# Patient Record
Sex: FEMALE | Race: WHITE | NOT HISPANIC OR LATINO
[De-identification: names, ages, dates, MRNs, and addresses within clinical notes are randomized per-mention and may not be internally consistent; named-entity substitution may affect disease eponyms.]

---

## 2019-08-06 ENCOUNTER — RESULT REVIEW (OUTPATIENT)
Age: 29
End: 2019-08-06

## 2021-01-13 ENCOUNTER — RESULT REVIEW (OUTPATIENT)
Age: 31
End: 2021-01-13

## 2022-03-24 PROBLEM — Z00.00 ENCOUNTER FOR PREVENTIVE HEALTH EXAMINATION: Status: ACTIVE | Noted: 2022-03-24

## 2022-04-09 ENCOUNTER — TRANSCRIPTION ENCOUNTER (OUTPATIENT)
Age: 32
End: 2022-04-09

## 2022-04-11 ENCOUNTER — APPOINTMENT (OUTPATIENT)
Dept: FAMILY MEDICINE | Facility: CLINIC | Age: 32
End: 2022-04-11

## 2022-04-12 ENCOUNTER — APPOINTMENT (OUTPATIENT)
Dept: FAMILY MEDICINE | Facility: CLINIC | Age: 32
End: 2022-04-12
Payer: COMMERCIAL

## 2022-04-12 ENCOUNTER — NON-APPOINTMENT (OUTPATIENT)
Age: 32
End: 2022-04-12

## 2022-04-12 VITALS
RESPIRATION RATE: 14 BRPM | DIASTOLIC BLOOD PRESSURE: 70 MMHG | SYSTOLIC BLOOD PRESSURE: 112 MMHG | HEIGHT: 63 IN | HEART RATE: 88 BPM | WEIGHT: 156 LBS | BODY MASS INDEX: 27.64 KG/M2 | OXYGEN SATURATION: 98 %

## 2022-04-12 DIAGNOSIS — Z76.89 PERSONS ENCOUNTERING HEALTH SERVICES IN OTHER SPECIFIED CIRCUMSTANCES: ICD-10-CM

## 2022-04-12 DIAGNOSIS — Z82.69 FAMILY HISTORY OF OTHER DISEASES OF THE MUSCULOSKELETAL SYSTEM AND CONNECTIVE TISSUE: ICD-10-CM

## 2022-04-12 DIAGNOSIS — F50.81 BINGE EATING DISORDER: ICD-10-CM

## 2022-04-12 DIAGNOSIS — Z78.9 OTHER SPECIFIED HEALTH STATUS: ICD-10-CM

## 2022-04-12 DIAGNOSIS — F41.1 GENERALIZED ANXIETY DISORDER: ICD-10-CM

## 2022-04-12 DIAGNOSIS — Z11.59 ENCOUNTER FOR SCREENING FOR OTHER VIRAL DISEASES: ICD-10-CM

## 2022-04-12 DIAGNOSIS — E66.3 OVERWEIGHT: ICD-10-CM

## 2022-04-12 DIAGNOSIS — Z13.21 ENCOUNTER FOR SCREENING FOR NUTRITIONAL DISORDER: ICD-10-CM

## 2022-04-12 LAB
BILIRUB UR QL STRIP: NEGATIVE
CYTOLOGY CVX/VAG DOC THIN PREP: NORMAL
GLUCOSE UR-MCNC: NORMAL
HCG UR QL: 0.2 EU/DL
HGB UR QL STRIP.AUTO: NEGATIVE
KETONES UR-MCNC: NEGATIVE
LEUKOCYTE ESTERASE UR QL STRIP: NEGATIVE
NITRITE UR QL STRIP: NEGATIVE
PH UR STRIP: 6.5
PROT UR STRIP-MCNC: NEGATIVE
SP GR UR STRIP: 1.02

## 2022-04-12 PROCEDURE — 90471 IMMUNIZATION ADMIN: CPT

## 2022-04-12 PROCEDURE — 81003 URINALYSIS AUTO W/O SCOPE: CPT | Mod: QW

## 2022-04-12 PROCEDURE — 99204 OFFICE O/P NEW MOD 45 MIN: CPT | Mod: 25

## 2022-04-12 PROCEDURE — 90715 TDAP VACCINE 7 YRS/> IM: CPT

## 2022-04-12 RX ORDER — CLOMIPRAMINE HYDROCHLORIDE 25 MG/1
25 CAPSULE ORAL
Qty: 14 | Refills: 0 | Status: DISCONTINUED | COMMUNITY
Start: 2021-12-13 | End: 2022-04-12

## 2022-04-12 RX ORDER — LISDEXAMFETAMINE DIMESYLATE 70 MG/1
70 CAPSULE ORAL
Qty: 30 | Refills: 0 | Status: ACTIVE | COMMUNITY
Start: 2021-12-14

## 2022-04-12 RX ORDER — OMEPRAZOLE 20 MG/1
20 CAPSULE, DELAYED RELEASE ORAL
Qty: 30 | Refills: 0 | Status: ACTIVE | COMMUNITY
Start: 2022-03-10

## 2022-04-12 RX ORDER — ARIPIPRAZOLE 2 MG/1
2 TABLET ORAL
Qty: 30 | Refills: 0 | Status: DISCONTINUED | COMMUNITY
Start: 2021-11-15 | End: 2022-04-12

## 2022-04-12 RX ORDER — CLONAZEPAM 0.5 MG/1
0.5 TABLET, ORALLY DISINTEGRATING ORAL DAILY
Refills: 0 | Status: ACTIVE | COMMUNITY
Start: 2022-04-06

## 2022-04-12 RX ORDER — CLOMIPRAMINE HYDROCHLORIDE 50 MG/1
50 CAPSULE ORAL
Qty: 14 | Refills: 0 | Status: DISCONTINUED | COMMUNITY
Start: 2022-01-14 | End: 2022-04-12

## 2022-04-12 NOTE — HEALTH RISK ASSESSMENT
[3] : 2) Feeling down, depressed, or hopeless for nearly every day (3) [Nearly Every Day (3)] : 6.) Feeling bad about yourself, or that you are a failure, or have let yourself or your family down? Nearly every day [1/2 of Days or More (2)] : 8.) Moving or speaking so slowly that other people could have noticed, or the opposite, moving or speaking faster than usual? Half the days or more [Severe] : severity of depression is severe [Extremely Difficult] : How difficult have these problems made it for you to do your work, take care of things at home, or get along with people? Extremely difficult [I have developed a follow-up plan documented below in the note.] : I have developed a follow-up plan documented below in the note. [[id number]] : 24

## 2022-04-12 NOTE — ASSESSMENT
[FreeTextEntry1] : Treatment Resistant Depression\par chronic\par PHQ9: 24 points, severe\par has established with psych, social work, has plan for inpatient treatment at [facility]\par passive SI, has safety plan in place\par psych follow up tomorrow\par c/w clomipramine, vyvanse, clonazepam.\par checking tsh and t4 levels today, checking cbc to eval cell lines. cmp to eval renal function and lytes.\par extensive discussion occurred where I advised patient to reach out to her safety team should she have active ideation and intent to commit suicide. Patient was agreeable to this. \par \par Anxiety\par chronic\par plan as above\par \par Binge eating disorder\par chronic\par plan as above\par \par Establish Care\par Labs ordered\par tdap administered\par medical record completed\par \par Regarding patient's BMI currently in range of:  overweight \par - encourage lifestyle modifications\par - diet and exercise\par - reduce calorie intake\par - no soda/ junk food/ snacks\par - consider mixed grains/ whole grains, leafy vegetables, and an appropriate serving of protein\par - lipid and a1c ordered\par \par Screening\par - vitamin D ordered\par - hiv and hep c ordered\par \par f/u for CPE\par labs drawn in office and patient will be notified of results when available\par Patient agrees with plan, all further questions answered during encounter.\par

## 2022-04-12 NOTE — REVIEW OF SYSTEMS
[Anxiety] : anxiety [Depression] : depression [Negative] : Heme/Lymph [Insomnia] : no insomnia [de-identified] : passive SI

## 2022-04-12 NOTE — HISTORY OF PRESENT ILLNESS
[FreeTextEntry1] : pt presents to establish care as new patient and blood work  [de-identified] : 30 yo female, hx of Depression/Anxiety who presents today to establish care. \jonah Is seeing Dr. Bonita Hu, [facility]. psych. \jonah Is out of work for her depression\jonah Is due to start in partial inpatient treatment at [place] [facility] for 2-6 weeks soon. 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 \jonah Has been battling depression as long as she can remember. \jonah Has tried every SNRI and SSRI out there. \jonah Has been started on meds since 8th grade. \jonah Has a suicidal plan with passive ideation, but has a safety plan which is to contact family if she has active thoughts to kill herself. \par She is living with parents now. Also sees  who checks in with her. She follows psych weekly, next check in with psych is tomorrow.  \jonah Does not have active ideation, is looking forward to being aunt as sister is giving birth at end of this month. \jonah Is fasting today for labs.\jonah Requests tdap today for pertussis protection.

## 2022-04-12 NOTE — PHYSICAL EXAM
[Normal Outer Ear/Nose] : the outer ears and nose were normal in appearance [Normal Oropharynx] : the oropharynx was normal [No Lymphadenopathy] : no lymphadenopathy [Supple] : supple [Thyroid Normal, No Nodules] : the thyroid was normal and there were no nodules present [Regular Rhythm] : with a regular rhythm [Normal S1, S2] : normal S1 and S2 [Soft] : abdomen soft [Non Tender] : non-tender [Non-distended] : non-distended [Normal Bowel Sounds] : normal bowel sounds [Coordination Grossly Intact] : coordination grossly intact [No Focal Deficits] : no focal deficits [Normal Gait] : normal gait [Normal Mental Status] : the patient's orientation, memory, attention, language and fund of knowledge were normal [Depressed] : depressed [Normal Rate] : a normal rate [Normal Rhythm] : a normal rhythm [Normal Tone] : normal tone [Normal Volume] : normal volume [Normal] : normal throught processes [Normal Associations] :  no deficiency [Suicidal Ideation] : admitted to suicidal ideation [Suicidal Plan] : admitted to suicidal plans [Anxious] : not anxious [Flat] : not flat [Labile] : not labile [Suicidal Intent] : denied suicidal intent [Homicidal Ideation] : denied homicidal ideation [Homicidal Intent] : denied homicidal intention [Homicidal Plan] : denied homicidal plans

## 2022-04-13 LAB
25(OH)D3 SERPL-MCNC: 16.5 NG/ML
ALBUMIN SERPL ELPH-MCNC: 4.5 G/DL
ALP BLD-CCNC: 76 U/L
ALT SERPL-CCNC: 13 U/L
ANION GAP SERPL CALC-SCNC: 13 MMOL/L
AST SERPL-CCNC: 18 U/L
BASOPHILS # BLD AUTO: 0.03 K/UL
BASOPHILS NFR BLD AUTO: 0.5 %
BILIRUB SERPL-MCNC: 0.5 MG/DL
BUN SERPL-MCNC: 10 MG/DL
CALCIUM SERPL-MCNC: 9.4 MG/DL
CHLORIDE SERPL-SCNC: 102 MMOL/L
CHOLEST SERPL-MCNC: 235 MG/DL
CO2 SERPL-SCNC: 23 MMOL/L
CREAT SERPL-MCNC: 0.85 MG/DL
EGFR: 94 ML/MIN/1.73M2
EOSINOPHIL # BLD AUTO: 0.01 K/UL
EOSINOPHIL NFR BLD AUTO: 0.2 %
ESTIMATED AVERAGE GLUCOSE: 111 MG/DL
GLUCOSE SERPL-MCNC: 77 MG/DL
HBA1C MFR BLD HPLC: 5.5 %
HCT VFR BLD CALC: 39.1 %
HCV AB SER QL: NONREACTIVE
HCV S/CO RATIO: 0.15 S/CO
HDLC SERPL-MCNC: 104 MG/DL
HGB BLD-MCNC: 12 G/DL
IMM GRANULOCYTES NFR BLD AUTO: 0.2 %
LDLC SERPL CALC-MCNC: 117 MG/DL
LYMPHOCYTES # BLD AUTO: 2.34 K/UL
LYMPHOCYTES NFR BLD AUTO: 41.3 %
MAN DIFF?: NORMAL
MCHC RBC-ENTMCNC: 28.8 PG
MCHC RBC-ENTMCNC: 30.7 GM/DL
MCV RBC AUTO: 94 FL
MONOCYTES # BLD AUTO: 0.55 K/UL
MONOCYTES NFR BLD AUTO: 9.7 %
NEUTROPHILS # BLD AUTO: 2.73 K/UL
NEUTROPHILS NFR BLD AUTO: 48.1 %
NONHDLC SERPL-MCNC: 131 MG/DL
PLATELET # BLD AUTO: 256 K/UL
POTASSIUM SERPL-SCNC: 5.2 MMOL/L
PROT SERPL-MCNC: 7.3 G/DL
RBC # BLD: 4.16 M/UL
RBC # FLD: 13.5 %
SODIUM SERPL-SCNC: 138 MMOL/L
T4 FREE SERPL-MCNC: 1 NG/DL
TRIGL SERPL-MCNC: 69 MG/DL
TSH SERPL-ACNC: 1.1 UIU/ML
WBC # FLD AUTO: 5.67 K/UL

## 2022-04-15 ENCOUNTER — RX RENEWAL (OUTPATIENT)
Age: 32
End: 2022-04-15

## 2022-04-16 LAB — HIV1+2 AB SPEC QL IA.RAPID: NONREACTIVE

## 2022-04-18 ENCOUNTER — APPOINTMENT (OUTPATIENT)
Dept: FAMILY MEDICINE | Facility: CLINIC | Age: 32
End: 2022-04-18
Payer: COMMERCIAL

## 2022-04-18 ENCOUNTER — NON-APPOINTMENT (OUTPATIENT)
Age: 32
End: 2022-04-18

## 2022-04-18 VITALS
HEART RATE: 86 BPM | SYSTOLIC BLOOD PRESSURE: 122 MMHG | HEIGHT: 63.5 IN | DIASTOLIC BLOOD PRESSURE: 84 MMHG | BODY MASS INDEX: 27.3 KG/M2 | OXYGEN SATURATION: 98 % | RESPIRATION RATE: 15 BRPM | WEIGHT: 156 LBS

## 2022-04-18 VITALS — TEMPERATURE: 97.4 F

## 2022-04-18 DIAGNOSIS — Z00.01 ENCOUNTER FOR GENERAL ADULT MEDICAL EXAMINATION WITH ABNORMAL FINDINGS: ICD-10-CM

## 2022-04-18 PROCEDURE — 99395 PREV VISIT EST AGE 18-39: CPT

## 2022-04-18 NOTE — PHYSICAL EXAM
[No Lymphadenopathy] : no lymphadenopathy [Supple] : supple [Pedal Pulses Present] : the pedal pulses are present [No Edema] : there was no peripheral edema [Soft] : abdomen soft [Non Tender] : non-tender [Non-distended] : non-distended [Normal Bowel Sounds] : normal bowel sounds [Normal] : coordination was normal [2+] : left 2+ [Normal Mental Status] : the patient's orientation, memory, attention, language and fund of knowledge were normal [Depressed] : depressed [Flat] : flat [Normal Rate] : a normal rate [Normal Rhythm] : a normal rhythm [Normal Tone] : normal tone [Normal Volume] : normal volume [Suicidal Ideation] : admitted to suicidal ideation [Dysdiadochokinesia Bilaterally] : not present [Anxious] : not anxious [Labile] : not labile [Suicidal Intent] : denied suicidal intent [Suicidal Plan] : denied suicidal plans [de-identified] : no calf tenderness bilaterally [de-identified] : exposed skin warm dry well perfused

## 2022-04-18 NOTE — REVIEW OF SYSTEMS
[Anxiety] : anxiety [Depression] : depression [Negative] : Heme/Lymph [Suicidal] : not suicidal [Insomnia] : no insomnia [de-identified] : no active SI

## 2022-04-18 NOTE — HISTORY OF PRESENT ILLNESS
[FreeTextEntry1] : Patient presents for physical exam  [de-identified] : 32 yo pmh of depression who presents today for CPE. \par Did intake interview with [facility] for psych program. \par Has meeting tomorrow to meet psychiatrist. \par Plan for Wednesday for starting active therapy inpatient. \par Did labs on prior visit. \par No other acute concerns today.

## 2022-04-18 NOTE — ASSESSMENT
[FreeTextEntry1] : CPE\par -Labs reviewed with patient during encounter. \par -HIV/ Hep C Screening, results reviewed \par -Advised annual ophthalmology, dental and dermatology visits\par -Annual depression screening - done on previous visit\par -tdap utd \par \par HLD\par Lipid panel reviewed from 4/2022\par LFTs wnl from 4/2022\par reviewed diet and lifestyle.\par pt is vegetarian, advised watching for foods such as diary that will contain cholesterol\par \par Vitamin D Def\par noted labs \par started on 50,000 IU weekly \par \par Depression\par has establish with inpatient treatment program at [place]\par undergoing intake \par passive SI, has safety plan in place\par psych follow up tomorrow\par c/w clomipramine, vyvanse, clonazepam.\par tsh and t4 wnl. \par \par \par f/u CPE in 1 year or sooner prn \par Patient agrees with plan, all further questions answered during encounter.\par

## 2022-04-18 NOTE — HEALTH RISK ASSESSMENT
[Good] : ~his/her~ current health as good [Poor] : ~his/her~ mood as  poor [Never] : Never [Yes] : Yes [Monthly or less (1 pt)] : Monthly or less (1 point) [1 or 2 (0 pts)] : 1 or 2 (0 points) [Never (0 pts)] : Never (0 points) [No] : In the past 12 months have you used drugs other than those required for medical reasons? No [No falls in past year] : Patient reported no falls in the past year [Patient declined mammogram] : Patient declined mammogram [Patient reported PAP Smear was normal] : Patient reported PAP Smear was normal [Patient declined bone density test] : Patient declined bone density test [Patient declined colonoscopy] : Patient declined colonoscopy [None] : None [With Family] : lives with family [On disability] : on disability [Single] : single [Feels Safe at Home] : Feels safe at home [Fully functional (bathing, dressing, toileting, transferring, walking, feeding)] : Fully functional (bathing, dressing, toileting, transferring, walking, feeding) [Fully functional (using the telephone, shopping, preparing meals, housekeeping, doing laundry, using] : Fully functional and needs no help or supervision to perform IADLs (using the telephone, shopping, preparing meals, housekeeping, doing laundry, using transportation, managing medications and managing finances) [Reports normal functional visual acuity (ie: able to read med bottle)] : Reports normal functional visual acuity [Smoke Detector] : smoke detector [Carbon Monoxide Detector] : carbon monoxide detector [Seat Belt] :  uses seat belt [Sunscreen] : uses sunscreen [HIV test declined] : HIV test declined [Hepatitis C test declined] : Hepatitis C test declined [With Patient/Caregiver] : , with patient/caregiver [Other reason not done] : Other reason not done [College] : College [de-identified] : Psych [Audit-CScore] : 1 [de-identified] : performed 1 week prior  [Language] : denies difficulty with language [Behavior] : denies difficulty with behavior [Handling Complex Tasks] : denies difficulty handling complex tasks [Reasoning] : denies difficulty with reasoning [Reports changes in hearing] : Reports no changes in hearing [Reports changes in vision] : Reports no changes in vision [Reports changes in dental health] : Reports no changes in dental health [Guns at Home] : no guns at home [Travel to Developing Areas] : does not  travel to developing areas [TB Exposure] : is not being exposed to tuberculosis [PapSmearDate] : 2021 [PapSmearComments] : reported normal  [HIVDate] : 4/2022 [HepatitisCDate] : 4/2022 [de-identified] : depression has spiked gradually within the last year  [de-identified] : on disability due to depression  [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

## 2022-08-23 RX ORDER — VILAZODONE HYDROCHLORIDE 40 MG/1
40 TABLET, FILM COATED ORAL DAILY
Refills: 0 | Status: ACTIVE | COMMUNITY
Start: 2022-08-23

## 2022-08-23 RX ORDER — LITHIUM CARBONATE 300 MG/1
300 TABLET ORAL TWICE DAILY
Refills: 0 | Status: ACTIVE | COMMUNITY
Start: 2022-08-23

## 2022-09-13 ENCOUNTER — APPOINTMENT (OUTPATIENT)
Dept: FAMILY MEDICINE | Facility: CLINIC | Age: 32
End: 2022-09-13

## 2022-09-13 VITALS
OXYGEN SATURATION: 98 % | SYSTOLIC BLOOD PRESSURE: 102 MMHG | WEIGHT: 148 LBS | BODY MASS INDEX: 25.9 KG/M2 | RESPIRATION RATE: 15 BRPM | HEIGHT: 63.5 IN | HEART RATE: 82 BPM | DIASTOLIC BLOOD PRESSURE: 78 MMHG | TEMPERATURE: 97.1 F

## 2022-09-13 DIAGNOSIS — E55.9 VITAMIN D DEFICIENCY, UNSPECIFIED: ICD-10-CM

## 2022-09-13 DIAGNOSIS — Z23 ENCOUNTER FOR IMMUNIZATION: ICD-10-CM

## 2022-09-13 DIAGNOSIS — L60.8 OTHER NAIL DISORDERS: ICD-10-CM

## 2022-09-13 DIAGNOSIS — Z02.89 ENCOUNTER FOR OTHER ADMINISTRATIVE EXAMINATIONS: ICD-10-CM

## 2022-09-13 DIAGNOSIS — E78.5 HYPERLIPIDEMIA, UNSPECIFIED: ICD-10-CM

## 2022-09-13 PROCEDURE — 90471 IMMUNIZATION ADMIN: CPT

## 2022-09-13 PROCEDURE — 99214 OFFICE O/P EST MOD 30 MIN: CPT | Mod: 25

## 2022-09-13 PROCEDURE — 90686 IIV4 VACC NO PRSV 0.5 ML IM: CPT

## 2022-09-13 RX ORDER — CLOMIPRAMINE HYDROCHLORIDE 50 MG/1
50 CAPSULE ORAL
Refills: 0 | Status: DISCONTINUED | COMMUNITY
Start: 2022-01-03 | End: 2022-09-13

## 2022-09-13 RX ORDER — ERGOCALCIFEROL 1.25 MG/1
1.25 MG CAPSULE, LIQUID FILLED ORAL
Qty: 12 | Refills: 0 | Status: DISCONTINUED | COMMUNITY
Start: 2022-04-13 | End: 2022-09-13

## 2022-09-13 NOTE — PHYSICAL EXAM
[Normal Sclera/Conjunctiva] : normal sclera/conjunctiva [Normal Outer Ear/Nose] : the outer ears and nose were normal in appearance [Normal Rate] : normal rate  [Regular Rhythm] : with a regular rhythm [Normal S1, S2] : normal S1 and S2 [Soft] : abdomen soft [Non Tender] : non-tender [Non-distended] : non-distended [Normal Bowel Sounds] : normal bowel sounds [Coordination Grossly Intact] : coordination grossly intact [No Focal Deficits] : no focal deficits [Normal Gait] : normal gait [Normal] : affect was normal and insight and judgment were intact [Suicidal Ideation] : denied suicidal ideation [Suicidal Intent] : denied suicidal intent [Suicidal Plan] : denied suicidal plans

## 2022-09-13 NOTE — ASSESSMENT
[FreeTextEntry1] : Depression\par chronic, stable\par now follows with new psych NP\par is on lithium \par reviewed lithium level from 9/8/2022, is in therapeutic range \par no active SI, states she feels much more safe now. \par \par HLD\par chronic stable\par Lipid panel reviewed from 4/2022 and compared to new one from 9/8/22\par much improved from prior \par \par Vit D Def\par vit D levels reviewed 9/2022, now wnl \par s/p 50k IU repletion\par now maintained with daily mvi \par \par Discolored nails\par checking fungal culture today\par advised if positive, we can discuss po treatment as can be hepatotoxic \par \par Form completion\par flu shot given\par Measles, Mumps, Rubella titers noted 7/2022, immune \par hep b surface ab noted 7/2022, immune\par quantiferon 7/2022 negative\par \par f/u 3-6 months \par labs to be done at outside lab, will notify patient of results when available and received.\par Patient agrees with plan, all further questions answered during encounter.\par \par

## 2022-09-13 NOTE — HISTORY OF PRESENT ILLNESS
[FreeTextEntry1] : patient presents for blood work results, and work forms \par also with complaint of nail discoloration  [de-identified] : 30 yo female, hx of depression who presents today for follow up. \jonah Says since last visit, she has started to see new psych NP at [facility] [place]. \jonah Says that she did the [facility] psych program, changed some meds. \jonah Feels that she is doing better now with meds she is on. \jonah Is happy she did the program and says that she feels much more safe and stable now. \par Still lives at home with parents and has strong support system. \par She is starting new per sury job now and needs forms completed, titer labs provided. \par Wants flu shot. \par Today also says she is concerned for toe nail fungus. \par located in left great toe. \par has been there for a month. \par \par No other complaints today.

## 2022-09-13 NOTE — REVIEW OF SYSTEMS
[Nail Changes] : nail changes [Negative] : ENT [Itching] : no itching [Mole Changes] : no mole changes [Hair Changes] : no hair changes [Skin Rash] : no skin rash [Insomnia] : no insomnia [Anxiety] : no anxiety [de-identified] : has decreased her SI, feels much more safe.

## 2022-10-18 ENCOUNTER — APPOINTMENT (OUTPATIENT)
Dept: FAMILY MEDICINE | Facility: CLINIC | Age: 32
End: 2022-10-18

## 2022-10-18 VITALS
HEIGHT: 63.5 IN | DIASTOLIC BLOOD PRESSURE: 62 MMHG | SYSTOLIC BLOOD PRESSURE: 134 MMHG | TEMPERATURE: 97 F | BODY MASS INDEX: 25.37 KG/M2 | OXYGEN SATURATION: 99 % | WEIGHT: 145 LBS | HEART RATE: 90 BPM | RESPIRATION RATE: 15 BRPM

## 2022-10-18 DIAGNOSIS — Z79.899 OTHER LONG TERM (CURRENT) DRUG THERAPY: ICD-10-CM

## 2022-10-18 DIAGNOSIS — F32.9 MAJOR DEPRESSIVE DISORDER, SINGLE EPISODE, UNSPECIFIED: ICD-10-CM

## 2022-10-18 DIAGNOSIS — Z51.81 ENCOUNTER FOR THERAPEUTIC DRUG LVL MONITORING: ICD-10-CM

## 2022-10-18 DIAGNOSIS — B35.1 TINEA UNGUIUM: ICD-10-CM

## 2022-10-18 PROCEDURE — 99214 OFFICE O/P EST MOD 30 MIN: CPT

## 2022-10-18 RX ORDER — TERBINAFINE HYDROCHLORIDE 250 MG/1
250 TABLET ORAL DAILY
Qty: 30 | Refills: 2 | Status: ACTIVE | COMMUNITY
Start: 2022-10-18 | End: 1900-01-01

## 2022-10-18 NOTE — REVIEW OF SYSTEMS
[Nail Changes] : nail changes [Negative] : Respiratory [Itching] : no itching [Skin Rash] : no skin rash

## 2022-10-18 NOTE — PHYSICAL EXAM
[Normal Rate] : normal rate  [Regular Rhythm] : with a regular rhythm [Normal S1, S2] : normal S1 and S2 [Coordination Grossly Intact] : coordination grossly intact [No Focal Deficits] : no focal deficits [Normal] : affect was normal and insight and judgment were intact [de-identified] : left great toe and 4th and 5th toes with thickening and discoloration

## 2022-10-18 NOTE — HISTORY OF PRESENT ILLNESS
[FreeTextEntry1] : Patient presents for follow up on fungus on toenail [de-identified] : 30 yo female, hx of depression who follows with [facility] Psych, who presents for follow up of fungal nail infection. \jonah Has had persistent discolored toenails of left foot. \par Denies itching. \par Culture collected last month, noted to have positive growth for fungal species. \jonah Is interested in treatment to eradicate infection. \jonah Says she is following with psych this thursday for med management.

## 2024-04-02 ENCOUNTER — APPOINTMENT (OUTPATIENT)
Dept: HEMATOLOGY ONCOLOGY | Facility: CLINIC | Age: 34
End: 2024-04-02
Payer: COMMERCIAL

## 2024-04-02 VITALS
HEIGHT: 63.5 IN | SYSTOLIC BLOOD PRESSURE: 112 MMHG | DIASTOLIC BLOOD PRESSURE: 68 MMHG | TEMPERATURE: 97.1 F | OXYGEN SATURATION: 99 % | HEART RATE: 61 BPM | BODY MASS INDEX: 28.35 KG/M2 | WEIGHT: 162 LBS

## 2024-04-02 DIAGNOSIS — Z78.9 OTHER SPECIFIED HEALTH STATUS: ICD-10-CM

## 2024-04-02 DIAGNOSIS — R53.83 OTHER FATIGUE: ICD-10-CM

## 2024-04-02 DIAGNOSIS — D50.9 IRON DEFICIENCY ANEMIA, UNSPECIFIED: ICD-10-CM

## 2024-04-02 DIAGNOSIS — Z82.3 FAMILY HISTORY OF STROKE: ICD-10-CM

## 2024-04-02 DIAGNOSIS — Z80.8 FAMILY HISTORY OF MALIGNANT NEOPLASM OF OTHER ORGANS OR SYSTEMS: ICD-10-CM

## 2024-04-02 LAB
RBC # BLD: 4 M/UL
RETICS # AUTO: 1.1 %
RETICS AGGREG/RBC NFR: 42.8 K/UL

## 2024-04-02 PROCEDURE — 99204 OFFICE O/P NEW MOD 45 MIN: CPT

## 2024-04-02 PROCEDURE — 36415 COLL VENOUS BLD VENIPUNCTURE: CPT

## 2024-04-02 NOTE — REVIEW OF SYSTEMS
[Fatigue] : fatigue [Recent Change In Weight] : ~T recent weight change [Negative] : Heme/Lymph [Anxiety] : anxiety [Depression] : depression [Muscle Weakness] : muscle weakness [Fever] : no fever [Night Sweats] : no night sweats [Chills] : no chills [Vision Problems] : no vision problems [Chest Pain] : no chest pain [Nosebleeds] : no nosebleeds [Shortness Of Breath] : no shortness of breath [Abdominal Pain] : no abdominal pain [Joint Pain] : no joint pain [Joint Stiffness] : no joint stiffness [Easy Bleeding] : no tendency for easy bleeding [Easy Bruising] : no tendency for easy bruising [FreeTextEntry2] : reports weight gain of 20 pounds in 3 months [FreeTextEntry7] : has had nausea

## 2024-04-02 NOTE — REASON FOR VISIT
[Initial Consultation] : an initial consultation for [FreeTextEntry2] : Iron deficiency anemia, fatigue

## 2024-04-02 NOTE — HISTORY OF PRESENT ILLNESS
[de-identified] : The patient is a 33-year-old female with a history of anxiety/depression hyperlipidemia, and COVID-19 in December 2023 who is referred for evaluation and management of iron deficiency.  Patient states that she was slightly anemic several months back and was placed on iron supplementation to which she responded satisfactorily and then discontinued the iron.  Patient is vegetarian for over 20 years and was taking a vegan liquid iron together with vitamin C.  In mid March 2024 the patient had a hemoglobin of 12.4 and a hematocrit of 38.0 with a normal white blood count and platelet count.  Iron panel revealed a serum iron of 67, iron binding capacity of 568, percent saturation of 12 and a ferritin of 6. TSH was normal. Patient does not have  periods as she is on oral contraceptives but does occasionally admit to spotting. She admits to hair loss and fatigue. She denies blood in the urine or stool.  She does not bleed from her nose or her mouth and does not bruise excessively.  She has chronic depression and anxiety and has been under the care of both a psychiatrist and a therapist and is presently on medication.  She denies chest pain, shortness of breath, abdominal pain, joint pain, fevers, chills, drenching sweats or present infections.

## 2024-04-02 NOTE — ASSESSMENT
[FreeTextEntry1] : The patient is a 33-year-old female with a history of anxiety/depression hyperlipidemia, and COVID-19 in December 2023 who is referred for evaluation and management of iron deficiency. Iron deficiency is likely due to patient's maintaining a vegetarian diet for many years.  She does seem to respond to oral iron supplementation as her most recent hemoglobin was normal but ferritin remains low. Her level of fatigue is likely due to to the effects of her present medications, her depression and possibly residual fatigue from the fairly recent COVID-19 infection in December. Have ordered CBC, reticulocyte count, manual differential, comprehensive metabolic panel, iron panel, ferritin, B12 and folate.  The patient was advised to resume her liquid iron together with vitamin C.  She will call the office to discuss results and further recommendations.

## 2024-04-03 LAB
ALBUMIN SERPL ELPH-MCNC: 4.3 G/DL
ALP BLD-CCNC: 75 U/L
ALT SERPL-CCNC: 9 U/L
ANION GAP SERPL CALC-SCNC: 8 MMOL/L
AST SERPL-CCNC: 18 U/L
BASOPHILS # BLD AUTO: 0 K/UL
BASOPHILS # BLD AUTO: 0 K/UL
BASOPHILS NFR BLD AUTO: 0 %
BASOPHILS NFR BLD AUTO: 0 %
BILIRUB SERPL-MCNC: 0.4 MG/DL
BUN SERPL-MCNC: 8 MG/DL
BURR CELLS BLD QL SMEAR: PRESENT
CALCIUM SERPL-MCNC: 9.4 MG/DL
CHLORIDE SERPL-SCNC: 104 MMOL/L
CO2 SERPL-SCNC: 24 MMOL/L
CREAT SERPL-MCNC: 0.8 MG/DL
EGFR: 100 ML/MIN/1.73M2
EOSINOPHIL # BLD AUTO: 0.08 K/UL
EOSINOPHIL # BLD AUTO: 0.08 K/UL
EOSINOPHIL NFR BLD AUTO: 0.9 %
EOSINOPHIL NFR BLD AUTO: 0.9 %
FERRITIN SERPL-MCNC: 20 NG/ML
FOLATE SERPL-MCNC: 11 NG/ML
GLUCOSE SERPL-MCNC: 76 MG/DL
HCT VFR BLD CALC: 39.3 %
HGB BLD-MCNC: 12.1 G/DL
IRON SATN MFR SERPL: 36 %
IRON SERPL-MCNC: 205 UG/DL
LYMPHOCYTES # BLD AUTO: 2.2 K/UL
LYMPHOCYTES # BLD AUTO: 2.2 K/UL
LYMPHOCYTES NFR BLD AUTO: 23.9 %
LYMPHOCYTES NFR BLD AUTO: 23.9 %
MAN DIFF?: NORMAL
MCHC RBC-ENTMCNC: 30.3 PG
MCHC RBC-ENTMCNC: 30.8 GM/DL
MCV RBC AUTO: 98.3 FL
MONOCYTES # BLD AUTO: 0.4 K/UL
MONOCYTES # BLD AUTO: 0.4 K/UL
MONOCYTES NFR BLD AUTO: 4.4 %
MONOCYTES NFR BLD AUTO: 4.4 %
MSMEAR: NORMAL
NEUTROPHILS # BLD AUTO: 6.51 K/UL
NEUTROPHILS # BLD AUTO: 6.51 K/UL
NEUTROPHILS NFR BLD AUTO: 70.8 %
NEUTROPHILS NFR BLD AUTO: 70.8 %
OVALOCYTES BLD QL SMEAR: SLIGHT
PLAT MORPH BLD: NORMAL
PLATELET # BLD AUTO: 297 K/UL
POIKILOCYTOSIS BLD QL SMEAR: SLIGHT
POLYCHROMASIA BLD QL SMEAR: SLIGHT
POTASSIUM SERPL-SCNC: 5.5 MMOL/L
PROT SERPL-MCNC: 7.1 G/DL
RBC # BLD: 4 M/UL
RBC # FLD: 14.3 %
RBC BLD AUTO: ABNORMAL
SODIUM SERPL-SCNC: 136 MMOL/L
TIBC SERPL-MCNC: 567 UG/DL
UIBC SERPL-MCNC: 361 UG/DL
VIT B12 SERPL-MCNC: 287 PG/ML
WBC # FLD AUTO: 9.19 K/UL